# Patient Record
Sex: MALE | Race: WHITE | NOT HISPANIC OR LATINO | Employment: FULL TIME | URBAN - METROPOLITAN AREA
[De-identification: names, ages, dates, MRNs, and addresses within clinical notes are randomized per-mention and may not be internally consistent; named-entity substitution may affect disease eponyms.]

---

## 2021-03-15 ENCOUNTER — COMMUNICATION - HEALTHEAST (OUTPATIENT)
Dept: SCHEDULING | Facility: CLINIC | Age: 36
End: 2021-03-15

## 2021-03-19 ENCOUNTER — OFFICE VISIT - HEALTHEAST (OUTPATIENT)
Dept: FAMILY MEDICINE | Facility: CLINIC | Age: 36
End: 2021-03-19

## 2021-03-19 DIAGNOSIS — K29.80 DUODENITIS: ICD-10-CM

## 2021-03-19 ASSESSMENT — MIFFLIN-ST. JEOR: SCORE: 2304.29

## 2021-06-05 VITALS
OXYGEN SATURATION: 97 % | TEMPERATURE: 97.5 F | BODY MASS INDEX: 34.17 KG/M2 | DIASTOLIC BLOOD PRESSURE: 70 MMHG | SYSTOLIC BLOOD PRESSURE: 120 MMHG | HEIGHT: 76 IN | HEART RATE: 65 BPM | WEIGHT: 280.6 LBS

## 2021-06-13 ENCOUNTER — HEALTH MAINTENANCE LETTER (OUTPATIENT)
Age: 36
End: 2021-06-13

## 2021-06-16 NOTE — PROGRESS NOTES
"    Assessment & Plan     Duodenitis    He does seem to be doing well at this point.  He is gradually increasing what he is eating and tolerating it well.  To get some forms to be filled out for him today to get him back to work.  If he has any further issues with this he will let us know.  Since he is new I reminded him that he certainly can come on in for a physical when he feels that that would be beneficial for him and we could do some other fasting blood work and develop a relationship with him here at this clinic.      Review of prior external note(s) from - Outside records from [facility]  Independent interpretation of a test performed by another physician/other qualified health care professional (not separately reported) - CT and other tests done  Discussion of management or test interpretation with external physician/other qualified healthcare professional/appropriate source -    [id number]}     BMI:   Estimated body mass index is 34.16 kg/m  as calculated from the following:    Height as of this encounter: 6' 4\" (1.93 m).    Weight as of this encounter: 280 lb 9.6 oz (127.3 kg).       No follow-ups on file.    Avery Moreira MD  [facility]   Beto Lowe is 35 y.o. and presents today for the following health issues   HPI     It is a new patient to us who is here to follow-up from his recent hospital stay.  He was in the hospital for a day earlier this week.  He had severe abdominal pain which came on rather suddenly.  Through the work-up he was found to have duodenitis and some ileitis.  He was basically treated with IV fluids nausea medication and rest and he did well and was able to go home the next day.  He is here to follow-up and he is doing quite well.  He is back to normal as far as what he is eating.  He would like to get back to work and has some work forms for that.    Review of Systems    A comprehensive Review of Systems was performed, and other than the " "positives mentioned above, the ROS was negative.     Patient is new patient to the clinic. Please see past medical history, surgical history, social history and family history, all of which were completed in their entirety today.       Objective    /70 (Patient Site: Left Arm, Patient Position: Sitting, Cuff Size: Adult Large)   Pulse 65   Temp 97.5  F (36.4  C) (Oral)   Ht 6' 4\" (1.93 m)   Wt (!) 280 lb 9.6 oz (127.3 kg)   SpO2 97%   BMI 34.16 kg/m    Body mass index is 34.16 kg/m .  Physical Exam    Well-appearing male in no acute distress.  Vital signs as noted.  Chest clear to auscultation.  Heart regular rate and rhythm.  Abdomen soft nontender nondistended bowel sounds present in all quadrants.              "

## 2021-10-03 ENCOUNTER — HEALTH MAINTENANCE LETTER (OUTPATIENT)
Age: 36
End: 2021-10-03

## 2021-10-06 ENCOUNTER — OFFICE VISIT (OUTPATIENT)
Dept: FAMILY MEDICINE | Facility: CLINIC | Age: 36
End: 2021-10-06
Payer: COMMERCIAL

## 2021-10-06 VITALS
WEIGHT: 286.44 LBS | HEART RATE: 77 BPM | BODY MASS INDEX: 34.87 KG/M2 | OXYGEN SATURATION: 95 % | DIASTOLIC BLOOD PRESSURE: 60 MMHG | RESPIRATION RATE: 20 BRPM | TEMPERATURE: 99 F | SYSTOLIC BLOOD PRESSURE: 124 MMHG

## 2021-10-06 DIAGNOSIS — R05.9 COUGH: ICD-10-CM

## 2021-10-06 DIAGNOSIS — J02.0 STREP PHARYNGITIS: ICD-10-CM

## 2021-10-06 DIAGNOSIS — H65.193 ACUTE MUCOID OTITIS MEDIA OF BOTH EARS: Primary | ICD-10-CM

## 2021-10-06 LAB — DEPRECATED S PYO AG THROAT QL EIA: POSITIVE

## 2021-10-06 PROCEDURE — 87880 STREP A ASSAY W/OPTIC: CPT | Performed by: PHYSICIAN ASSISTANT

## 2021-10-06 PROCEDURE — U0005 INFEC AGEN DETEC AMPLI PROBE: HCPCS | Performed by: PHYSICIAN ASSISTANT

## 2021-10-06 PROCEDURE — 99213 OFFICE O/P EST LOW 20 MIN: CPT | Performed by: PHYSICIAN ASSISTANT

## 2021-10-06 PROCEDURE — U0003 INFECTIOUS AGENT DETECTION BY NUCLEIC ACID (DNA OR RNA); SEVERE ACUTE RESPIRATORY SYNDROME CORONAVIRUS 2 (SARS-COV-2) (CORONAVIRUS DISEASE [COVID-19]), AMPLIFIED PROBE TECHNIQUE, MAKING USE OF HIGH THROUGHPUT TECHNOLOGIES AS DESCRIBED BY CMS-2020-01-R: HCPCS | Performed by: PHYSICIAN ASSISTANT

## 2021-10-06 RX ORDER — CEFDINIR 300 MG/1
300 CAPSULE ORAL 2 TIMES DAILY
Qty: 20 CAPSULE | Refills: 0 | Status: SHIPPED | OUTPATIENT
Start: 2021-10-06 | End: 2021-10-16

## 2021-10-06 NOTE — PATIENT INSTRUCTIONS
"You were seen today for an infection of the middle ear, also called otitis media.    Treatment:  - Use antibiotics as prescribed until completion, even if symptoms improve  - May use tylenol or ibuprofen for pain and discomfort  - Should notice symptom improvement in the next 36-48 hours  - Recommend daily use of a probiotic while taking prescribed medication (a common brand is Culturelle, yogurt with \"active cultures\" are also appropriate)    When to come back sooner for re-evaluation?  - If symptoms have not begun improving after 72 hours of taking antibiotics  - Develop a fever or current fever worsens  - Become short of breath  - Neck stiffness  - Difficulty swallowing     You were seen today for a cough. This is likely due to a virus and will improve over the next 1-2 weeks on its own.    Symptom management:  - Drink plenty of non-caffeinated fluids  - Avoid smoke exposure  - May use tylenol or ibuprofen for discomfort  - Drink a warm non-caffeinated tea with honey  - Place a warm humidifier in your bedroom at night  - [employer] VaporRub    Reasons to return for re-evaluation:  - Develop a fever 100.4 or higher, current fever worsens, or fever does not improve in 72 hours  - Difficulty breathing or shortness of breath  - Cough continues to worsen including coughing up blood or coughing up thick, colored phlegm  - Unable to tolerate fluids    Otherwise, if symptoms have not improved in 7 days, follow-up with your primary care provider.    Discharge Instructions for COVID-19 Patients  You have--or may have--COVID-19. Please follow the instructions listed below.   If you have a weakened immune system, discuss with your doctor any other actions you need to take.  How can I protect others?  If you have symptoms (fever, cough, body aches or trouble breathing):    Stay home and away from others (self-isolate) until:  ? Your other symptoms have resolved (gotten better). And   ? You've had no fever--and no medicine that " "reduces fever--for 1 full day (24 hours). And   ? At least 10 days have passed since your symptoms started. (You may need to wait 20 days. Follow the advice of your care team.)  If you don't show symptoms, but testing showed that you have COVID-19:    Stay home and away from others (self-isolate) until at least 10 days have passed since the date of your first positive COVID-19 test.  During this time    Stay in your own room, even for meals. Use your own bathroom if you can.    Stay away from others in your home. No hugging, kissing or shaking hands. No visitors.    Don't go to work, school or anywhere else.    Clean \"high touch\" surfaces often (doorknobs, counters, handles). Use household cleaning spray or wipes.    You'll find a full list of  on the EPA website: www.epa.gov/pesticide-registration/list-n-disinfectants-use-against-sars-cov-2.    Cover your mouth and nose with a mask or other face covering to avoid spreading germs.    Wash your hands and face often. Use soap and water.    Caregivers in these groups are at risk for severe illness due to COVID-19:  ? People 65 years and older  ? People who live in a nursing home or long-term care facility  ? People with chronic disease (lung, heart, cancer, diabetes, kidney, liver, immunologic)  ? People who have a weakened immune system, including those who:    Are in cancer treatment    Take medicine that weakens the immune system, such as corticosteroids    Had a bone marrow or organ transplant    Have an immune deficiency    Have poorly controlled HIV or AIDS    Are obese (body mass index of 40 or higher)    Smoke regularly    Caregivers should wear gloves while washing dishes, handling laundry and cleaning bedrooms and bathrooms.    Use caution when washing and drying laundry: Don't shake dirty laundry and use the warmest water setting that you can.    For more tips on managing your health at home, go to " www.cdc.gov/coronavirus/2019-ncov/downloads/10Things.pdf.  How can I take care of myself at home?  1. Get lots of rest. Drink extra fluids (unless a doctor has told you not to).  2. Take Tylenol (acetaminophen) for fever or pain. If you have liver or kidney problems, ask your family doctor if it's okay to take Tylenol.   Adults can take either:   ? 650 mg (two 325 mg pills) every 4 to 6 hours, or   ? 1,000 mg (two 500 mg pills) every 8 hours as needed.  ? Note: Don't take more than 3,000 mg in one day. Acetaminophen is found in many medicines (both prescribed and over-the-counter medicines). Read all labels to be sure you don't take too much.   For children, check the Tylenol bottle for the right dose. The dose is based on the child's age or weight.  3. If you have other health problems (like cancer, heart failure, an organ transplant or severe kidney disease): Call your specialty clinic if you don't feel better in the next 2 days.  4. Know when to call 911. Emergency warning signs include:  ? Trouble breathing or shortness of breath  ? Pain or pressure in the chest that doesn't go away  ? Feeling confused like you haven't felt before, or not being able to wake up  ? Bluish-colored lips or face  5. Your doctor may have prescribed a blood thinner medicine. Follow their instructions.  Where can I get more information?    [facility] - About COVID-19:   [email or web address]    CDC - What to Do If You're Sick: www.cdc.gov/coronavirus/2019-ncov/about/steps-when-sick.html    CDC - Ending Home Isolation: www.cdc.gov/coronavirus/2019-ncov/hcp/disposition-in-home-patients.html    CDC - Caring for Someone: www.cdc.gov/coronavirus/2019-ncov/if-you-are-sick/care-for-someone.html    [facility] - Interim Guidance for Hospital Discharge to Home: www.health.[facility].[place]/diseases/coronavirus/hcp/hospdischarge.pdf    Below are the COVID-19 hotlines at the [place] Department of Health ([facility]). Interpreters are  available.  ? For health questions: Call 095-227-7779 or 1-638.715.7244 (7 a.m. to 7 p.m.)  ? For questions about schools and childcare: Call 670-726-5503 or 1-122.953.3402 (7 a.m. to 7 p.m.)    For informational purposes only. Not to replace the advice of your health care provider. Clinically reviewed by Dr. Arjun Wallace.   Copyright   2020 [facility]. All rights reserved. [employer] [id number] - REV 01/05/21.

## 2021-10-06 NOTE — PROGRESS NOTES
Assessment & Plan:      Problem List Items Addressed This Visit     None      Visit Diagnoses     Acute mucoid otitis media of both ears    -  Primary    Relevant Medications    cefdinir (OMNICEF) 300 MG capsule    Cough        Relevant Orders    Symptomatic COVID-19 Virus (Coronavirus) by PCR Nose    Strep pharyngitis        Relevant Orders    Streptococcus A Rapid Screen w/Reflex to PCR - Clinic Collect (Completed)        Medical Decision Making  Patient presents with acute onset ear pains and throat pain.  Physical exam does show signs concerning for otitis media worse on right compared to left.  Patient also has positive rapid strep.  Will treat patient with oral antibiotics.  Change toothbrush after 72 hours.  Continue with over-the-counter analgesics, fluids, and honey as needed.  There is also in process COVID-19.  Discussed self-isolation and prevention of spreading illness.  No signs of respiratory distress.  Discussed signs of worsening symptoms and when to follow-up with PCP if no symptom improvement.     Subjective:      Beto Lowe is a 35 year old male here for evaluation of sore throat and ear pains.  Onset of symptoms was 3 to 4 days ago.  Patient initially developed fevers of 102 max that seem to have improved slightly today.  He then noted ear pain on the left side as well as sore throat.  Associated symptoms include occasional cough.  Patient denies shortness of breath.  He has been trying oral decongestants and fever reducers with minimal relief.     The following portions of the patient's history were reviewed and updated as appropriate: allergies, current medications, and problem list.     Review of Systems  Pertinent items are noted in HPI.    Allergies  Allergies   Allergen Reactions     Amoxicillin        Family History   Problem Relation Age of Onset     Breast Cancer Mother      No Known Problems Father      Diabetes Maternal Grandmother        Social History     Tobacco Use     Smoking  status: Never Smoker     Smokeless tobacco: Never Used   Substance Use Topics     Alcohol use: Yes     Comment: Alcoholic Drinks/day: 1-2/month        Objective:      /60   Pulse 77   Temp 99  F (37.2  C)   Resp 20   Wt 129.9 kg (286 lb 7 oz)   SpO2 95%   BMI 34.87 kg/m    General appearance - alert, well appearing, and in no distress and non-toxic  Ears - Right: TM intact with mucoid fluid, bulging, erythema.  Left: TM intact with mucoid fluid and mild bulging, no erythema  Nose - normal and patent, no erythema, discharge or polyps  Mouth - Posterior oropharynx is erythematous with moderate tonsillar swelling, no exudate, mucous membranes moist  Neck - Moderate bilateral anterior cervical lymphadenopathy, worse on left compared to right  Chest - clear to auscultation, no wheezes, rales or rhonchi, symmetric air entry  Heart - normal rate, regular rhythm, normal S1, S2, no murmurs, rubs, clicks or gallops     Lab & Imaging Results    Results for orders placed or performed in visit on 10/06/21 (from the past 24 hour(s))   Streptococcus A Rapid Screen w/Reflex to PCR - Clinic Collect    Specimen: Throat; Swab   Result Value Ref Range    Group A Strep antigen Positive (A) Negative       I personally reviewed these results and discussed findings with the patient.

## 2021-10-07 LAB — SARS-COV-2 RNA RESP QL NAA+PROBE: POSITIVE

## 2022-07-10 ENCOUNTER — HEALTH MAINTENANCE LETTER (OUTPATIENT)
Age: 37
End: 2022-07-10

## 2022-09-10 ENCOUNTER — HEALTH MAINTENANCE LETTER (OUTPATIENT)
Age: 37
End: 2022-09-10

## 2023-07-23 ENCOUNTER — HEALTH MAINTENANCE LETTER (OUTPATIENT)
Age: 38
End: 2023-07-23

## 2024-09-15 ENCOUNTER — HEALTH MAINTENANCE LETTER (OUTPATIENT)
Age: 39
End: 2024-09-15